# Patient Record
Sex: FEMALE | Race: WHITE | NOT HISPANIC OR LATINO | Employment: FULL TIME | URBAN - METROPOLITAN AREA
[De-identification: names, ages, dates, MRNs, and addresses within clinical notes are randomized per-mention and may not be internally consistent; named-entity substitution may affect disease eponyms.]

---

## 2017-11-14 ENCOUNTER — APPOINTMENT (OUTPATIENT)
Dept: RADIOLOGY | Facility: IMAGING CENTER | Age: 37
End: 2017-11-14
Attending: PHYSICIAN ASSISTANT
Payer: COMMERCIAL

## 2017-11-14 ENCOUNTER — OFFICE VISIT (OUTPATIENT)
Dept: URGENT CARE | Facility: CLINIC | Age: 37
End: 2017-11-14
Payer: COMMERCIAL

## 2017-11-14 VITALS
RESPIRATION RATE: 14 BRPM | DIASTOLIC BLOOD PRESSURE: 64 MMHG | WEIGHT: 160 LBS | BODY MASS INDEX: 25.71 KG/M2 | HEART RATE: 100 BPM | TEMPERATURE: 98.7 F | HEIGHT: 66 IN | OXYGEN SATURATION: 97 % | SYSTOLIC BLOOD PRESSURE: 120 MMHG

## 2017-11-14 DIAGNOSIS — S62.346A CLOSED NONDISPLACED FRACTURE OF BASE OF FIFTH METACARPAL BONE OF RIGHT HAND, INITIAL ENCOUNTER: ICD-10-CM

## 2017-11-14 DIAGNOSIS — S69.91XA INJURY OF RIGHT HAND, INITIAL ENCOUNTER: ICD-10-CM

## 2017-11-14 PROCEDURE — 99204 OFFICE O/P NEW MOD 45 MIN: CPT | Performed by: PHYSICIAN ASSISTANT

## 2017-11-14 PROCEDURE — 73130 X-RAY EXAM OF HAND: CPT | Mod: TC,RT | Performed by: PHYSICIAN ASSISTANT

## 2017-11-14 RX ORDER — HYDROCODONE BITARTRATE AND ACETAMINOPHEN 5; 325 MG/1; MG/1
1-2 TABLET ORAL EVERY 6 HOURS PRN
Qty: 20 TAB | Refills: 0 | Status: SHIPPED | OUTPATIENT
Start: 2017-11-14 | End: 2017-11-27

## 2017-11-14 RX ORDER — IBUPROFEN 800 MG/1
800 TABLET ORAL EVERY 6 HOURS PRN
Qty: 28 TAB | Refills: 0 | Status: SHIPPED | OUTPATIENT
Start: 2017-11-14 | End: 2017-11-14 | Stop reason: SDUPTHER

## 2017-11-14 RX ORDER — IBUPROFEN 800 MG/1
800 TABLET ORAL EVERY 6 HOURS PRN
Qty: 28 TAB | Refills: 0 | Status: SHIPPED | OUTPATIENT
Start: 2017-11-14

## 2017-11-14 ASSESSMENT — ENCOUNTER SYMPTOMS
COUGH: 0
TINGLING: 0
DOUBLE VISION: 0
TREMORS: 0
CHILLS: 0
PALPITATIONS: 0
SENSORY CHANGE: 0
FOCAL WEAKNESS: 0
SHORTNESS OF BREATH: 0
BLURRED VISION: 0
FEVER: 0
HEADACHES: 0
SEIZURES: 0
SPEECH CHANGE: 0
DIZZINESS: 0
LOSS OF CONSCIOUSNESS: 0

## 2017-11-14 NOTE — PROGRESS NOTES
"Subjective:      Sivan Mendoza is a 37 y.o. female who presents with Wrist Injury ((R) wrist from a fall about 4 am this morning)            Wrist Injury    The incident occurred 1 to 3 hours ago. The incident occurred at home. The injury mechanism was a fall. Pain location: right hand. The pain is moderate. Pertinent negatives include no chest pain or tingling. She has tried ice and NSAIDs for the symptoms. The treatment provided moderate relief.       Review of Systems   Constitutional: Negative for chills and fever.   Eyes: Negative for blurred vision and double vision.   Respiratory: Negative for cough and shortness of breath.    Cardiovascular: Negative for chest pain and palpitations.   Musculoskeletal:        Right hand pain     Skin: Negative for rash.   Neurological: Negative for dizziness, tingling, tremors, sensory change, speech change, focal weakness, seizures, loss of consciousness and headaches.   All other systems reviewed and are negative.    PMH:  has no past medical history on file.  MEDS: No current outpatient prescriptions on file.  ALLERGIES:   Allergies   Allergen Reactions   • Keflex      SURGHX: History reviewed. No pertinent surgical history.  SOCHX:  reports that she has never smoked. She has never used smokeless tobacco.  FH: Family history was reviewed, no pertinent findings to report  Medications, Allergies, and current problem list reviewed today in Epic     Objective:     /64   Pulse 100   Temp 37.1 °C (98.7 °F)   Resp 14   Ht 1.676 m (5' 6\")   Wt 72.6 kg (160 lb)   SpO2 97%   BMI 25.82 kg/m²      Physical Exam   Constitutional: She is oriented to person, place, and time. She appears well-developed and well-nourished.   Cardiovascular: Normal rate, regular rhythm, normal heart sounds, intact distal pulses and normal pulses.    Pulmonary/Chest: Effort normal and breath sounds normal.   Musculoskeletal: She exhibits tenderness. She exhibits no edema or deformity.   Limited " ROM of 4th and 5th digit of right hand.  Significant swelling and bruising of the 4th and 5th metacarpal.   Neurological: She is alert and oriented to person, place, and time. She has normal reflexes. She displays normal reflexes. She exhibits normal muscle tone. Coordination normal.   Skin: Skin is warm and dry.   Psychiatric: She has a normal mood and affect. Her behavior is normal. Judgment and thought content normal.   Vitals reviewed.              11/14/2017 10:27 AM    HISTORY/REASON FOR EXAM:  Pain/Deformity Following Trauma  Ground-level fall    TECHNIQUE/EXAM DESCRIPTION AND NUMBER OF VIEWS:  3 views of the RIGHT hand.    COMPARISON:  None    FINDINGS:  There is a nondisplaced fracture of the proximal fifth metacarpal with overlying soft tissue swelling. No definite intra-articular extension is identified. No dislocation is seen. There is mild periarticular osteopenia.     Impression       Nondisplaced fracture of the proximal fifth metacarpal with associated soft tissue swelling.       Assessment/Plan:     1. Closed nondisplaced fracture of base of fifth metacarpal bone of right hand, initial encounter  DX-HAND 3+ RIGHT    REFERRAL TO SPORTS MEDICINE    hydrocodone-acetaminophen (NORCO) 5-325 MG Tab per tablet    ibuprofen (MOTRIN) 800 MG Tab     [place]  Aware web site evaluation: I have obtained and reviewed patient utilization report from [facility] [place] pharmacy database prior to writing prescription for controlled substance II, III or IV per [place] bill . Based on the report and my clinical assessment the prescription is medically necessary.       Differential diagnosis, natural history, supportive care, and indications for immediate follow-up discussed at length.   Follow-up with primary care provider within 4-5 days, emergency room precautions discussed.  Patient and/or family appears understanding of information.

## 2017-11-14 NOTE — PATIENT INSTRUCTIONS
Hand Fracture, Fifth Metacarpal  The small metacarpal is the bone at the base of the little finger between the knuckle and the wrist. A fracture is a break in that bone. One of the fractures that is common to this bone is called a Boxer's Fracture.  TREATMENT  These fractures can be treated with:   · Reduction (bones moved back into place), then pinned through the skin to maintain the position, and then casted for about 6 weeks or as your caregiver determines necessary.  · ORIF (open reduction and internal fixation) - the fracture site is opened and the bone pieces are fixed into place with pins and then casted for approximately 6 weeks or as your caregiver determines necessary.  Your caregiver will discuss the type of fracture you have and the treatment that should be best for that problem. If surgery is the treatment of choice, the following is information for you to know, and also let your caregiver know about prior to surgery.   LET YOUR CAREGIVER KNOW ABOUT:  · Allergies.  · Medications taken including herbs, eye drops, over the counter medications, and creams.  · Use of steroids (by mouth or creams).  · Previous problems with anesthetics or novocaine.  · Possibility of pregnancy, if this applies.  · History of blood clots (thrombophlebitis).  · History of bleeding or blood problems.  · Previous surgery.  · Other health problems.  AFTER THE PROCEDURE  After surgery, you will be taken to the recovery area where a nurse will watch and check your progress. Once you're awake, stable, and taking fluids well, barring other problems you'll be allowed to go home. Once home an ice pack applied to your operative site may help with discomfort and keep the swelling down.  HOME CARE INSTRUCTIONS   · Follow your caregiver's instructions as to activities, exercises, physical therapy, and driving a car.  · Daily exercise is helpful for maintaining range of motion (movement and mobility) and strength. Exercise as  instructed.  · To lessen swelling, keep the injured hand elevated above the level of your heart as much as possible.  · Apply ice to the injury for 15-20 minutes each hour while awake for the first 2 days. Put the ice in a plastic bag and place a thin towel between the bag of ice and your cast.  · Move the fingers of your casted hand at least several times a day.  · If a plaster or fiberglass cast was applied:  · Do not try to scratch the skin under the cast using a sharp or pointed object.  · Check the skin around the cast every day. You may put lotion on red or sore areas.  · Keep your cast dry. Your cast can be protected during bathing with a plastic bag. Do not put your cast into the water.  · If a plaster splint was applied:  · Wear the splint for as long as directed by your caregiver or until seen for follow-up examination.  · Do not get your splint wet. Protect it during bathing with a plastic bag.  · You may loosen the elastic bandage around the splint if your fingers start to get numb, tingle, get cold or turn blue.  · Do not put pressure on your cast or splint; this may cause it to break. Especially, do not lean plaster casts on hard surfaces for 24 hours after application.  · Take medications as directed by your caregiver.  · Only take over-the-counter or prescription medicines for pain, discomfort, or fever as directed by your caregiver.  · Follow all instructions for physician referrals, physical therapy, and rehabilitation. Any delay in obtaining necessary care could result in permanent injury, disability and chronic pain.  SEEK MEDICAL CARE IF:   · Increased bleeding (more than a small spot) from the wound or from beneath your cast or splint if there is a wound beneath the cast from surgery.  · Redness, swelling, or increasing pain in the wound or from beneath your cast or splint.  · Pus coming from wound or from beneath your cast or splint.  · An unexplained oral temperature above 102° F (38.9° C)  develops.  · A foul smell coming from the wound or dressing or from beneath your cast or splint.  · You are unable to move your little finger.  SEEK IMMEDIATE MEDICAL CARE IF:   You develop a rash, have difficulty breathing, or have any allergy problems.  If you do not have a window in your cast for observing the wound, a discharge or minor bleeding may show up as a stain on the outside of your cast. Report these findings to your caregiver.  MAKE SURE YOU:   · Understand these instructions.  · Will watch your condition.  · Will get help right away if you are not doing well or get worse.  Document Released: 03/26/2002 Document Revised: 03/11/2013 Document Reviewed: 08/06/2009  [employer]® Patient Information ©2014 [employer].

## 2017-11-16 ENCOUNTER — OFFICE VISIT (OUTPATIENT)
Dept: MEDICAL GROUP | Facility: CLINIC | Age: 37
End: 2017-11-16
Payer: COMMERCIAL

## 2017-11-16 VITALS
BODY MASS INDEX: 25.71 KG/M2 | HEIGHT: 66 IN | OXYGEN SATURATION: 97 % | WEIGHT: 160 LBS | SYSTOLIC BLOOD PRESSURE: 112 MMHG | TEMPERATURE: 98.1 F | DIASTOLIC BLOOD PRESSURE: 70 MMHG | HEART RATE: 78 BPM | RESPIRATION RATE: 16 BRPM

## 2017-11-16 DIAGNOSIS — S62.346A NONDISPLACED FRACTURE OF BASE OF FIFTH METACARPAL BONE, RIGHT HAND, INITIAL ENCOUNTER FOR CLOSED FRACTURE: ICD-10-CM

## 2017-11-16 PROCEDURE — 99999 PR NO CHARGE: CPT | Performed by: FAMILY MEDICINE

## 2017-11-16 PROCEDURE — 26600 TREAT METACARPAL FRACTURE: CPT | Mod: F9 | Performed by: FAMILY MEDICINE

## 2017-11-16 ASSESSMENT — ENCOUNTER SYMPTOMS
VOMITING: 1
NAUSEA: 1
SHORTNESS OF BREATH: 0
DIZZINESS: 0
CHILLS: 0
FEVER: 0

## 2017-11-16 NOTE — PROGRESS NOTES
"Subjective:      Sivan Mendoza is a 37 y.o. female who presents with Hand Injury (Referral from [facility]/ R hand injury )      Referred by Natan Mason PA-C for evaluation of RIGHT hand pain    HPI     RIGHT hand pain (right handed)  Date of injury Tuesday, November 14, 2017  Mechanism of injury got angry and punched a wall  Sudden pain at the ulnar side of her RIGHT hand  Sharp  No radiation  Swelling seems to be worse  Taking 800 mg ibuprofen for pain which is helping some, also taking Norco which actually made her nauseous and throw up  Seen in urgent care, had x-rays placed in a ulnar gutter splint  Denies any prior issues with the wrist or hand    Review of Systems   Constitutional: Negative for chills and fever.   Respiratory: Negative for shortness of breath.    Cardiovascular: Negative for chest pain.   Gastrointestinal: Positive for nausea and vomiting.        Symptoms have resolved since discontinuing hydrocodone   Neurological: Negative for dizziness.     PMH:  has no past medical history on file.  MEDS:   Current Outpatient Prescriptions:   •  hydrocodone-acetaminophen (NORCO) 5-325 MG Tab per tablet, Take 1-2 Tabs by mouth every 6 hours as needed., Disp: 20 Tab, Rfl: 0  •  ibuprofen (MOTRIN) 800 MG Tab, Take 1 Tab by mouth every 6 hours as needed for Moderate Pain., Disp: 28 Tab, Rfl: 0  ALLERGIES:   Allergies   Allergen Reactions   • Keflex      SURGHX: No past surgical history on file.  SOCHX:  reports that she has never smoked. She has never used smokeless tobacco.  FH: Family history was reviewed, no pertinent findings to report       Objective:     /70   Pulse 78   Temp 36.7 °C (98.1 °F)   Resp 16   Ht 1.676 m (5' 6\")   Wt 72.6 kg (160 lb)   SpO2 97%   BMI 25.82 kg/m²       Physical Exam      Hand exam    NAD  Alert and oriented    BILATERAL range of motion is NORMAL  No elbow tenderness    BILATERAL WRIST exam  Range of motion intact  No tenderness along scaphoid, TFCC insertion, distal " radius or distal ulna  Tinel's testing is NEGATIVE  The hand is otherwise neurovascularly intact    BILATERAL hand exam  POSITIVE swelling along the fourth and fifth metacarpals  POSITIVE tenderness along the fifth metacarpal, particularly the proximal aspect  NO deformity  Range of motion of all MCP, DIP and PIP joints NORMAL  Passive motion of the fifth finger does not demonstrate any rotational deformity  Pinky opposition NORMAL  Grind test is NEGATIVE  Collateral ligament testing is NORMAL       Assessment/Plan:     1. Nondisplaced fracture of base of fifth metacarpal bone, right hand, initial encounter for closed fracture       Fracture stabilized in the office today in an ulnar gutter cast  The plan is to continue ulnar gutter immobilization for a total of 4 weeks  We will look into temporary splinting options before her follow up to see if we can transition her to a temporary removable splint on or after 11/27 until 12/14    Return in about 11 days (around 11/27/2017).                                  Results for orders placed in visit on 11/14/17   DX-HAND 3+ RIGHT    Impression Nondisplaced fracture of the proximal fifth metacarpal with associated soft tissue swelling.                                                            Interpreted in the office today with the patient    Thank you Natan Mason PA-C for allowing me to participate in caring for your patient      ADDENDUM:  FMLA forms completed, see scanned for further information.

## 2017-11-27 ENCOUNTER — OFFICE VISIT (OUTPATIENT)
Dept: MEDICAL GROUP | Facility: CLINIC | Age: 37
End: 2017-11-27
Payer: COMMERCIAL

## 2017-11-27 ENCOUNTER — APPOINTMENT (OUTPATIENT)
Dept: RADIOLOGY | Facility: IMAGING CENTER | Age: 37
End: 2017-11-27
Attending: FAMILY MEDICINE
Payer: COMMERCIAL

## 2017-11-27 VITALS
BODY MASS INDEX: 25.71 KG/M2 | OXYGEN SATURATION: 98 % | DIASTOLIC BLOOD PRESSURE: 70 MMHG | SYSTOLIC BLOOD PRESSURE: 110 MMHG | TEMPERATURE: 98.2 F | HEIGHT: 66 IN | RESPIRATION RATE: 16 BRPM | HEART RATE: 78 BPM | WEIGHT: 160 LBS

## 2017-11-27 DIAGNOSIS — S62.346A NONDISPLACED FRACTURE OF BASE OF FIFTH METACARPAL BONE, RIGHT HAND, INITIAL ENCOUNTER FOR CLOSED FRACTURE: ICD-10-CM

## 2017-11-27 PROCEDURE — 99024 POSTOP FOLLOW-UP VISIT: CPT | Performed by: FAMILY MEDICINE

## 2017-11-27 PROCEDURE — 73130 X-RAY EXAM OF HAND: CPT | Mod: TC,RT | Performed by: FAMILY MEDICINE

## 2017-11-27 RX ORDER — TRAMADOL HYDROCHLORIDE 50 MG/1
50 TABLET ORAL EVERY 8 HOURS PRN
Qty: 20 TAB | Refills: 0 | Status: SHIPPED | OUTPATIENT
Start: 2017-11-27

## 2017-11-28 NOTE — PROGRESS NOTES
"Subjective:      Sivan Mendoza is a 37 y.o. female who presents with Follow-Up (F/V  Cast check )      Follow up for RIGHT hand base of 5th metacarpal fracture    HPI     RIGHT hand pain (right handed)  Date of injury Tuesday, November 14, 2017  Mechanism of injury got angry and punched a wall  Sudden pain at the ulnar side of her RIGHT hand  Sharp  No radiation  Swelling improved  Taking 800 mg ibuprofen for pain which is helping some,   Failed Norco (nauseous and throw up)  Here for cast check/removal  Denies any prior issues with the wrist or hand       Objective:     /70   Pulse 78   Temp 36.8 °C (98.2 °F)   Resp 16   Ht 1.676 m (5' 6\")   Wt 72.6 kg (160 lb)   SpO2 98%   BMI 25.82 kg/m²      Physical Exam      Hand exam    NAD  Alert and oriented    BILATERAL range of motion is NORMAL  No elbow tenderness    RIGHT hand exam  MILD swelling along the fourth and fifth metacarpals  POSITIVE tenderness along the fifth metacarpal, particularly the proximal aspect  NO deformity  Range of motion of all MCP, DIP and PIP joints NORMAL  Passive motion of the fifth finger does not demonstrate any rotational deformity  Pinky opposition NORMAL  Grind test is NEGATIVE  Collateral ligament testing is NORMAL       Assessment/Plan:     1. Nondisplaced fracture of base of fifth metacarpal bone, right hand, initial encounter for closed fracture  DX-HAND 3+ RIGHT    tramadol (ULTRAM) 50 MG Tab     Fracture stabilized in the office today in removable ulnar gutter splint  The plan is to continue ulnar gutter immobilization for a total of 4 weeks (until 12/14)    Return in about 2 weeks (around 12/11/2017).                                  11/27/2017 4:48 PM    HISTORY/REASON FOR EXAM:  Pain/Deformity Following Trauma.  RIGHT hand fracture, follow-up    TECHNIQUE/EXAM DESCRIPTION AND NUMBER OF VIEWS:  3 views of the RIGHT hand.    COMPARISON: 11/14/2017    FINDINGS:  Fractures again seen at the base of 5th metacarpal, " likely less apparent than on prior exam.  No significant callus formation.  Alignment is unchanged.  No focal soft tissue swelling.  Carpal alignment is preserved.   Impression       1.  Healing fracture at the base of RIGHT 5th metacarpal.  2.  No significant change in alignment.         Results for orders placed in visit on 11/14/17   DX-HAND 3+ RIGHT    Impression Nondisplaced fracture of the proximal fifth metacarpal with associated soft tissue swelling.                                                            Interpreted in the office today with the patient    Thank you Natan Mason PA-C for allowing me to participate in caring for your patient

## 2017-12-11 ENCOUNTER — OFFICE VISIT (OUTPATIENT)
Dept: MEDICAL GROUP | Facility: CLINIC | Age: 37
End: 2017-12-11
Payer: COMMERCIAL

## 2017-12-11 VITALS
OXYGEN SATURATION: 98 % | HEART RATE: 76 BPM | TEMPERATURE: 98 F | DIASTOLIC BLOOD PRESSURE: 70 MMHG | RESPIRATION RATE: 16 BRPM | SYSTOLIC BLOOD PRESSURE: 112 MMHG | WEIGHT: 160 LBS | HEIGHT: 66 IN | BODY MASS INDEX: 25.71 KG/M2

## 2017-12-11 DIAGNOSIS — S62.346D CLOSED NONDISPLACED FRACTURE OF BASE OF FIFTH METACARPAL BONE OF RIGHT HAND WITH ROUTINE HEALING, SUBSEQUENT ENCOUNTER: ICD-10-CM

## 2017-12-11 PROCEDURE — 99024 POSTOP FOLLOW-UP VISIT: CPT | Performed by: FAMILY MEDICINE

## 2017-12-11 NOTE — PROGRESS NOTES
"Subjective:      Sivan Mendoza is a 37 y.o. female who presents with Follow-Up (F/V Cast check )      Follow up for RIGHT hand base of 5th metacarpal fracture    HPI     RIGHT hand pain (right handed)  Date of injury Tuesday, November 14, 2017  Mechanism of injury got angry and punched a wall  Sudden pain at the ulnar side of her RIGHT hand  Sharp  No radiation  Swelling improved  Less pain, some occasional pain with banging hand despite splint     Objective:     /70   Pulse 76   Temp 36.7 °C (98 °F)   Resp 16   Ht 1.676 m (5' 6\")   Wt 72.6 kg (160 lb)   SpO2 98%   BMI 25.82 kg/m²      Physical Exam      Hand exam    NAD  Alert and oriented    BILATERAL range of motion is NORMAL  No elbow tenderness    RIGHT hand exam  MINIMAL swelling along the fourth and fifth metacarpals  MILD tenderness along the fifth metacarpal, particularly the proximal aspect     Assessment/Plan:     1. Closed nondisplaced fracture of base of fifth metacarpal bone of right hand with routine healing, subsequent encounter       Continue fracture stabilization in removable ulnar gutter splint for a total of 4 weeks (until 12/14)  ROM exercises    Return in about 11 days (around 12/22/2017).                                  11/27/2017 4:48 PM    HISTORY/REASON FOR EXAM:  Pain/Deformity Following Trauma.  RIGHT hand fracture, follow-up    TECHNIQUE/EXAM DESCRIPTION AND NUMBER OF VIEWS:  3 views of the RIGHT hand.    COMPARISON: 11/14/2017    FINDINGS:  Fractures again seen at the base of 5th metacarpal, likely less apparent than on prior exam.  No significant callus formation.  Alignment is unchanged.  No focal soft tissue swelling.  Carpal alignment is preserved.   Impression       1.  Healing fracture at the base of RIGHT 5th metacarpal.  2.  No significant change in alignment.       Results for orders placed in visit on 11/14/17   DX-HAND 3+ RIGHT    Impression Nondisplaced fracture of the proximal fifth metacarpal with associated " soft tissue swelling.                                                            Thank you Natan Mason PA-C for allowing me to participate in caring for your patient

## 2017-12-28 ENCOUNTER — OFFICE VISIT (OUTPATIENT)
Dept: MEDICAL GROUP | Facility: CLINIC | Age: 37
End: 2017-12-28
Payer: COMMERCIAL

## 2017-12-28 VITALS
SYSTOLIC BLOOD PRESSURE: 110 MMHG | BODY MASS INDEX: 25.71 KG/M2 | OXYGEN SATURATION: 98 % | HEART RATE: 76 BPM | DIASTOLIC BLOOD PRESSURE: 70 MMHG | RESPIRATION RATE: 16 BRPM | HEIGHT: 66 IN | WEIGHT: 160 LBS | TEMPERATURE: 99 F

## 2017-12-28 DIAGNOSIS — S62.346D CLOSED NONDISPLACED FRACTURE OF BASE OF FIFTH METACARPAL BONE OF RIGHT HAND WITH ROUTINE HEALING, SUBSEQUENT ENCOUNTER: ICD-10-CM

## 2017-12-28 PROCEDURE — 99024 POSTOP FOLLOW-UP VISIT: CPT | Performed by: FAMILY MEDICINE

## 2017-12-28 NOTE — PROGRESS NOTES
"Subjective:      Sivan Mendoza is a 37 y.o. female who presents with Hand Injury (F/V R hand injury )      Follow up for RIGHT hand base of 5th metacarpal fracture    HPI     RIGHT hand pain (right handed)  Date of injury Tuesday, November 14, 2017  Mechanism of injury got angry and punched a wall  MINIMAL to No pain  Swelling improved  Motion slightly limited       Objective:     /70   Pulse 76   Temp 37.2 °C (99 °F)   Resp 16   Ht 1.676 m (5' 6\")   Wt 72.6 kg (160 lb)   SpO2 98%   BMI 25.82 kg/m²      Physical Exam      Hand exam    NAD  Alert and oriented    BILATERAL range of motion is NORMAL  No elbow tenderness    RIGHT hand exam  MINIMAL swelling along the fourth and fifth metacarpals  NO tenderness along the fifth metacarpal, particularly the proximal aspect     Assessment/Plan:     1. Closed nondisplaced fracture of base of fifth metacarpal bone of right hand with routine healing, subsequent encounter  REFERRAL TO OCCUPATIONAL THERAPY Reason for Therapy: Eval/Treat/Report     No need for splinting  ROM exercises, still stiff  refereral for hand therapy    No Follow-up on file.                                  11/27/2017 4:48 PM    HISTORY/REASON FOR EXAM:  Pain/Deformity Following Trauma.  RIGHT hand fracture, follow-up    TECHNIQUE/EXAM DESCRIPTION AND NUMBER OF VIEWS:  3 views of the RIGHT hand.    COMPARISON: 11/14/2017    FINDINGS:  Fractures again seen at the base of 5th metacarpal, likely less apparent than on prior exam.  No significant callus formation.  Alignment is unchanged.  No focal soft tissue swelling.  Carpal alignment is preserved.   Impression       1.  Healing fracture at the base of RIGHT 5th metacarpal.  2.  No significant change in alignment.       Results for orders placed in visit on 11/14/17   DX-HAND 3+ RIGHT    Impression Nondisplaced fracture of the proximal fifth metacarpal with associated soft tissue swelling.                                                         "    Thank you Natan Mason PA-C for allowing me to participate in caring for your patient    ADDENDUM:  12/29/17  Completed [employer] paperwork  Cleared for work with restriction of NOT lifting >20 lbs for 2 weeks (1/12/18)  Called and left  for patient notifying her  See scanned forms for additional info

## 2018-01-04 ENCOUNTER — TELEPHONE (OUTPATIENT)
Dept: MEDICAL GROUP | Facility: CLINIC | Age: 38
End: 2018-01-04

## 2018-01-04 NOTE — TELEPHONE ENCOUNTER
Person from [employer] called today to clarify that Sivan would continue to have work restrictions with the computer mouse and typing until the 8th of January.    Please advise.  Thank you

## 2018-01-05 ENCOUNTER — DOCUMENTATION (OUTPATIENT)
Dept: MEDICAL GROUP | Facility: CLINIC | Age: 38
End: 2018-01-05

## 2018-01-05 DIAGNOSIS — S62.346D CLOSED NONDISPLACED FRACTURE OF BASE OF FIFTH METACARPAL BONE OF RIGHT HAND WITH ROUTINE HEALING, SUBSEQUENT ENCOUNTER: ICD-10-CM

## 2018-01-05 NOTE — PROGRESS NOTES
1. Closed nondisplaced fracture of base of fifth metacarpal bone of right hand with routine healing, subsequent encounter         [employer] called regarding clarification of restrictions  They were notified that patient is unable to type until 1/8/17

## 2019-05-14 ENCOUNTER — HOSPITAL ENCOUNTER (OUTPATIENT)
Dept: LAB | Facility: MEDICAL CENTER | Age: 39
End: 2019-05-14
Attending: FAMILY MEDICINE
Payer: COMMERCIAL

## 2019-05-14 PROCEDURE — 87798 DETECT AGENT NOS DNA AMP: CPT

## 2019-05-14 PROCEDURE — 87529 HSV DNA AMP PROBE: CPT

## 2019-05-15 LAB
AMBIGUOUS DTTM AMBI4: NORMAL
SIGNIFICANT IND 70042: NORMAL
SITE SITE: NORMAL
SOURCE SOURCE: NORMAL

## 2019-05-16 LAB
HSV1 DNA SPEC QL NAA+PROBE: NEGATIVE
HSV2 DNA SPEC QL NAA+PROBE: NEGATIVE
SPECIMEN SOURCE: NORMAL

## 2019-05-17 LAB — MISCELLANEOUS LAB RESULT MISCLAB: NORMAL

## 2019-09-23 ENCOUNTER — HOSPITAL ENCOUNTER (OUTPATIENT)
Dept: RADIOLOGY | Facility: MEDICAL CENTER | Age: 39
End: 2019-09-23
Attending: OBSTETRICS & GYNECOLOGY
Payer: COMMERCIAL

## 2019-09-23 DIAGNOSIS — Z12.31 VISIT FOR SCREENING MAMMOGRAM: ICD-10-CM

## 2019-09-23 PROCEDURE — 77067 SCR MAMMO BI INCL CAD: CPT

## 2019-09-26 ENCOUNTER — HOSPITAL ENCOUNTER (OUTPATIENT)
Dept: RADIOLOGY | Facility: MEDICAL CENTER | Age: 39
End: 2019-09-26
Attending: OBSTETRICS & GYNECOLOGY
Payer: COMMERCIAL

## 2019-09-26 DIAGNOSIS — R92.8 ABNORMAL MAMMOGRAM: ICD-10-CM

## 2019-09-26 PROCEDURE — G0279 TOMOSYNTHESIS, MAMMO: HCPCS | Mod: RT

## 2019-10-04 ENCOUNTER — HOSPITAL ENCOUNTER (OUTPATIENT)
Dept: RADIOLOGY | Facility: MEDICAL CENTER | Age: 39
End: 2019-10-04
Attending: FAMILY MEDICINE
Payer: COMMERCIAL

## 2019-10-04 DIAGNOSIS — R05.9 COUGH: ICD-10-CM

## 2019-10-04 PROCEDURE — 71045 X-RAY EXAM CHEST 1 VIEW: CPT

## 2019-12-17 ENCOUNTER — APPOINTMENT (OUTPATIENT)
Dept: BEHAVIORAL HEALTH | Facility: CLINIC | Age: 39
End: 2019-12-17
Payer: COMMERCIAL

## 2023-03-03 ENCOUNTER — TELEPHONE (OUTPATIENT)
Dept: RADIOLOGY | Facility: MEDICAL CENTER | Age: 43
End: 2023-03-03

## 2023-03-14 ENCOUNTER — HOSPITAL ENCOUNTER (OUTPATIENT)
Dept: RADIOLOGY | Facility: MEDICAL CENTER | Age: 43
End: 2023-03-14
Attending: OBSTETRICS & GYNECOLOGY
Payer: COMMERCIAL

## 2023-03-14 DIAGNOSIS — Z12.31 BREAST CANCER SCREENING BY MAMMOGRAM: ICD-10-CM

## 2023-03-14 PROCEDURE — 77063 BREAST TOMOSYNTHESIS BI: CPT

## 2024-06-18 ENCOUNTER — HOSPITAL ENCOUNTER (OUTPATIENT)
Dept: RADIOLOGY | Facility: MEDICAL CENTER | Age: 44
End: 2024-06-18
Attending: OBSTETRICS & GYNECOLOGY
Payer: COMMERCIAL

## 2024-06-18 DIAGNOSIS — Z12.31 VISIT FOR SCREENING MAMMOGRAM: ICD-10-CM

## 2024-06-18 PROCEDURE — 77063 BREAST TOMOSYNTHESIS BI: CPT
